# Patient Record
Sex: MALE | Race: WHITE | NOT HISPANIC OR LATINO | Employment: OTHER | URBAN - METROPOLITAN AREA
[De-identification: names, ages, dates, MRNs, and addresses within clinical notes are randomized per-mention and may not be internally consistent; named-entity substitution may affect disease eponyms.]

---

## 2022-06-17 ENCOUNTER — APPOINTMENT (OUTPATIENT)
Dept: CT IMAGING | Facility: CLINIC | Age: 67
End: 2022-06-17
Attending: EMERGENCY MEDICINE
Payer: COMMERCIAL

## 2022-06-17 ENCOUNTER — HOSPITAL ENCOUNTER (EMERGENCY)
Facility: CLINIC | Age: 67
Discharge: HOME | End: 2022-06-17
Attending: EMERGENCY MEDICINE | Admitting: EMERGENCY MEDICINE
Payer: COMMERCIAL

## 2022-06-17 VITALS
TEMPERATURE: 97.1 F | SYSTOLIC BLOOD PRESSURE: 129 MMHG | RESPIRATION RATE: 18 BRPM | OXYGEN SATURATION: 97 % | DIASTOLIC BLOOD PRESSURE: 82 MMHG | HEART RATE: 61 BPM

## 2022-06-17 DIAGNOSIS — S09.90XA CLOSED HEAD INJURY, INITIAL ENCOUNTER: ICD-10-CM

## 2022-06-17 DIAGNOSIS — S00.83XA CONTUSION OF FACE, INITIAL ENCOUNTER: ICD-10-CM

## 2022-06-17 DIAGNOSIS — S00.432A HEMATOMA OF LEFT AURICULAR REGION: ICD-10-CM

## 2022-06-17 DIAGNOSIS — F10.920 ACUTE ALCOHOLIC INTOXICATION WITHOUT COMPLICATION (H): ICD-10-CM

## 2022-06-17 LAB
ALBUMIN SERPL-MCNC: 3.3 G/DL (ref 3.4–5)
ALP SERPL-CCNC: 85 U/L (ref 40–150)
ALT SERPL W P-5'-P-CCNC: 16 U/L (ref 0–70)
ANION GAP SERPL CALCULATED.3IONS-SCNC: 8 MMOL/L (ref 3–14)
AST SERPL W P-5'-P-CCNC: 20 U/L (ref 0–45)
BASOPHILS # BLD AUTO: 0.1 10E3/UL (ref 0–0.2)
BASOPHILS NFR BLD AUTO: 1 %
BILIRUB SERPL-MCNC: 0.3 MG/DL (ref 0.2–1.3)
BUN SERPL-MCNC: 13 MG/DL (ref 7–30)
CALCIUM SERPL-MCNC: 8.2 MG/DL (ref 8.5–10.1)
CHLORIDE BLD-SCNC: 103 MMOL/L (ref 94–109)
CO2 SERPL-SCNC: 27 MMOL/L (ref 20–32)
CREAT SERPL-MCNC: 0.8 MG/DL (ref 0.66–1.25)
EOSINOPHIL # BLD AUTO: 0.1 10E3/UL (ref 0–0.7)
EOSINOPHIL NFR BLD AUTO: 2 %
ERYTHROCYTE [DISTWIDTH] IN BLOOD BY AUTOMATED COUNT: 13.7 % (ref 10–15)
ETHANOL SERPL-MCNC: 0.21 G/DL
GFR SERPL CREATININE-BSD FRML MDRD: >90 ML/MIN/1.73M2
GLUCOSE BLD-MCNC: 91 MG/DL (ref 70–99)
HCT VFR BLD AUTO: 43 % (ref 40–53)
HGB BLD-MCNC: 14.1 G/DL (ref 13.3–17.7)
IMM GRANULOCYTES # BLD: 0 10E3/UL
IMM GRANULOCYTES NFR BLD: 0 %
LYMPHOCYTES # BLD AUTO: 3 10E3/UL (ref 0.8–5.3)
LYMPHOCYTES NFR BLD AUTO: 41 %
MAGNESIUM SERPL-MCNC: 2.2 MG/DL (ref 1.6–2.3)
MCH RBC QN AUTO: 33 PG (ref 26.5–33)
MCHC RBC AUTO-ENTMCNC: 32.8 G/DL (ref 31.5–36.5)
MCV RBC AUTO: 101 FL (ref 78–100)
MONOCYTES # BLD AUTO: 0.8 10E3/UL (ref 0–1.3)
MONOCYTES NFR BLD AUTO: 10 %
NEUTROPHILS # BLD AUTO: 3.4 10E3/UL (ref 1.6–8.3)
NEUTROPHILS NFR BLD AUTO: 46 %
NRBC # BLD AUTO: 0 10E3/UL
NRBC BLD AUTO-RTO: 0 /100
PLATELET # BLD AUTO: 220 10E3/UL (ref 150–450)
POTASSIUM BLD-SCNC: 4.2 MMOL/L (ref 3.4–5.3)
PROT SERPL-MCNC: 6.6 G/DL (ref 6.8–8.8)
RBC # BLD AUTO: 4.27 10E6/UL (ref 4.4–5.9)
SODIUM SERPL-SCNC: 138 MMOL/L (ref 133–144)
VALPROATE SERPL-MCNC: 92 MG/L
WBC # BLD AUTO: 7.4 10E3/UL (ref 4–11)

## 2022-06-17 PROCEDURE — 70450 CT HEAD/BRAIN W/O DYE: CPT

## 2022-06-17 PROCEDURE — 82077 ASSAY SPEC XCP UR&BREATH IA: CPT | Performed by: EMERGENCY MEDICINE

## 2022-06-17 PROCEDURE — 83735 ASSAY OF MAGNESIUM: CPT | Performed by: EMERGENCY MEDICINE

## 2022-06-17 PROCEDURE — 36415 COLL VENOUS BLD VENIPUNCTURE: CPT | Performed by: EMERGENCY MEDICINE

## 2022-06-17 PROCEDURE — 72125 CT NECK SPINE W/O DYE: CPT

## 2022-06-17 PROCEDURE — 69000 DRG XTRNL EAR ABSC/HEM SMPL: CPT | Mod: LT

## 2022-06-17 PROCEDURE — 80053 COMPREHEN METABOLIC PANEL: CPT | Performed by: EMERGENCY MEDICINE

## 2022-06-17 PROCEDURE — 80164 ASSAY DIPROPYLACETIC ACD TOT: CPT | Performed by: EMERGENCY MEDICINE

## 2022-06-17 PROCEDURE — 93005 ELECTROCARDIOGRAM TRACING: CPT | Mod: 59

## 2022-06-17 PROCEDURE — 250N000013 HC RX MED GY IP 250 OP 250 PS 637: Performed by: EMERGENCY MEDICINE

## 2022-06-17 PROCEDURE — 96360 HYDRATION IV INFUSION INIT: CPT | Mod: 59

## 2022-06-17 PROCEDURE — 85025 COMPLETE CBC W/AUTO DIFF WBC: CPT | Performed by: EMERGENCY MEDICINE

## 2022-06-17 PROCEDURE — 258N000003 HC RX IP 258 OP 636: Performed by: EMERGENCY MEDICINE

## 2022-06-17 PROCEDURE — 99285 EMERGENCY DEPT VISIT HI MDM: CPT | Mod: 25

## 2022-06-17 RX ORDER — LEVOFLOXACIN 750 MG/1
750 TABLET, FILM COATED ORAL DAILY
Qty: 7 TABLET | Refills: 0 | Status: SHIPPED | OUTPATIENT
Start: 2022-06-17 | End: 2022-06-24

## 2022-06-17 RX ORDER — ONDANSETRON 2 MG/ML
4 INJECTION INTRAMUSCULAR; INTRAVENOUS ONCE
Status: DISCONTINUED | OUTPATIENT
Start: 2022-06-17 | End: 2022-06-18 | Stop reason: HOSPADM

## 2022-06-17 RX ORDER — OXYCODONE HYDROCHLORIDE 5 MG/1
5-10 TABLET ORAL EVERY 6 HOURS PRN
Qty: 10 TABLET | Refills: 0 | Status: SHIPPED | OUTPATIENT
Start: 2022-06-17 | End: 2022-06-20

## 2022-06-17 RX ORDER — OXYCODONE HYDROCHLORIDE 5 MG/1
5 TABLET ORAL ONCE
Status: COMPLETED | OUTPATIENT
Start: 2022-06-17 | End: 2022-06-17

## 2022-06-17 RX ORDER — SODIUM CHLORIDE 9 MG/ML
INJECTION, SOLUTION INTRAVENOUS CONTINUOUS
Status: DISCONTINUED | OUTPATIENT
Start: 2022-06-17 | End: 2022-06-18 | Stop reason: HOSPADM

## 2022-06-17 RX ADMIN — SODIUM CHLORIDE 1000 ML: 9 INJECTION, SOLUTION INTRAVENOUS at 19:55

## 2022-06-17 RX ADMIN — OXYCODONE HYDROCHLORIDE 5 MG: 5 TABLET ORAL at 22:03

## 2022-06-17 ASSESSMENT — ENCOUNTER SYMPTOMS
CONSTITUTIONAL NEGATIVE: 1
HEADACHES: 0
MUSCULOSKELETAL NEGATIVE: 1
DIZZINESS: 0
EYES NEGATIVE: 1

## 2022-06-18 LAB
ATRIAL RATE - MUSE: 60 BPM
DIASTOLIC BLOOD PRESSURE - MUSE: NORMAL MMHG
INTERPRETATION ECG - MUSE: NORMAL
P AXIS - MUSE: 80 DEGREES
PR INTERVAL - MUSE: 186 MS
QRS DURATION - MUSE: 112 MS
QT - MUSE: 412 MS
QTC - MUSE: 412 MS
R AXIS - MUSE: 56 DEGREES
SYSTOLIC BLOOD PRESSURE - MUSE: NORMAL MMHG
T AXIS - MUSE: 69 DEGREES
VENTRICULAR RATE- MUSE: 60 BPM

## 2022-06-18 NOTE — ED TRIAGE NOTES
Fell off barstool at [employer] 1 hr ago.  Pt is intoxicated.  Lt ear swelling and ecchymosis.  Pt denies any pain.

## 2022-06-18 NOTE — DISCHARGE INSTRUCTIONS
Discharge Instructions  Head Injury    You have been seen today for a head injury. Your evaluation included a history and physical examination. You may have had a CT (CAT) scan performed, though most head injuries do not require a scan. Based on this evaluation, your provider today does not feel that your head injury is serious.    Generally, every Emergency Department visit should have a follow-up clinic visit with either a primary or a specialty clinic/provider. Please follow-up as instructed by your emergency provider today.  Return to the Emergency Department if:  You are confused or you are not acting right.  Your headache gets worse or you start to have a really bad headache even with your recommended treatment plan.  You vomit (throw up) more than once.  You have a seizure.  You have trouble walking.  You have weakness or paralysis (cannot move) in an arm or a leg.  You have blood or fluid coming from your ears or nose.  You have new symptoms or anything that worries you.    Sleeping:  It is okay for you to sleep, but someone should wake you up if instructed by your provider, and someone should check on you at your usual time to wake up.     Activity:  Do not drive for at least 24 hours.  Do not drive if you have dizzy spells or trouble concentrating, or remembering things.  Do not return to any contact sports until cleared by your regular provider.     MORE INFORMATION:    Concussion:  A concussion is a minor head injury that may cause temporary problems with the way the brain works. Although concussions are important, they are generally not an emergency or a reason that a person needs to be hospitalized. Some concussion symptoms include confusion, amnesia (forgetful), nausea (sick to your stomach) and vomiting (throwing up), dizziness, fatigue, memory or concentration problems, irritability and sleep problems. For most people, concussions are mild and temporary but some will have more severe and persistent  symptoms that require on-going care and treatment.  CT Scans: Your evaluation today may have included a CT scan (CAT scan) to look for things like bleeding or a skull fracture (broken bone).  CT scans involve radiation and too many CT scans can cause serious health problems like cancer, especially in children.  Because of this, your provider may not have ordered a CT scan today if they think you are at low risk for a serious or life threatening problem.    If you were given a prescription for medicine here today, be sure to read all of the information (including the package insert) that comes with your prescription.  This will include important information about the medicine, its side effects, and any warnings that you need to know about.  The pharmacist who fills the prescription can provide more information and answer questions you may have about the medicine.  If you have questions or concerns that the pharmacist cannot address, please call or return to the Emergency Department.     Remember that you can always come back to the Emergency Department if you are not able to see your regular provider in the amount of time listed above, if you get any new symptoms, or if there is anything that worries you.     You had a hematoma (blood collection) of your left ear which has been drained to help prevent a chronic deformity. Keep the bandage on until follow-up with primary doctor in 3 days (keep it on for up to 7 days if able) or consider ENT (ear nose throat) specialty evaluation with ongoing swelling, redness, increasing pain, fever, or return to the ED.

## 2022-06-18 NOTE — ED NOTES
Pt's son assisted pt up to restroom. RN noticed pt walking in the hallway very swiftly and unsteady. Pt instructed to call for help prior to getting up going forward.

## 2022-06-18 NOTE — ED PROVIDER NOTES
History     Chief Complaint:  Fall     HPI   Elie Low is a 66 year old male with a history of hypertension, high cholesterol and heart disease who presents with concerns for a fall off a stool resulting in head injury.  He notes he was at [employer] after work.  He had 5 beers.  He does not drink daily.  He does not recall falling or passing out but thinks he might of lost consciousness as he remembers being on the floor with someone helping him.  Per his son who was not there, there was no clear witness of the event.  The patient notes he had nausea and vomited multiple times.  He no longer feels nausea.  He denies any pain aside from his left ear.  He denies headache, neck pain, back pain, extremity pain.  He denies any chest pain or shortness of breath.  Denies abdominal pain.  He denies any use of blood thinning medications.    ROS:  Review of Systems   Constitutional: Negative.    HENT:        Positive for left ear pain   Eyes: Negative.    Musculoskeletal: Negative.    Neurological: Negative for dizziness and headaches.   All other systems reviewed and are negative.        Allergies:  Coreg [Carvedilol]     Medications:    aspirin 81 MG tablet  atorvastatin (LIPITOR) 80 MG tablet  divalproex (DEPAKOTE) 500 MG EC tablet  lisinopril (PRINIVIL,ZESTRIL) 5 MG tablet  Multiple Vitamins-Minerals (MULTIVITAL PO)  nicotine (NICODERM CQ) 21 MG/24HR patch 2h hr  Nitroglycerin 400 MCG/SPRAY AERS        Past Medical History:    Localization-related (focal) (partial) epilepsy and epileptic syndromes with complex partial seizures, without mention of intractable epilepsy (HRC)       Hypertension (HRC)       Hyperlipidemia (HRC)       Coronary artery disease (HRC)       Peripheral vascular disease (HRC)       Unspecified personality disorder           Past Surgical History:    PTCA     LW Problem: PTCA S/p LW Modifier: 3.5 mm Cypher Stent in RCA LW Onset: [id number]     HERNIA REPAIR          AORTO-FEMORAL BYPASS  GRAFT             Family History:    Cardiovascular Disease Birth Father        High Blood Pressure Birth Father        High Blood Pressure Birth Mother        Cardiovascular Disease Brother        Diabetes Maternal Grandfather        High Blood Pressure Maternal Grandfather             Social History:  Current Some Day Smoker Cigarettes 0.25   Started: 10/16/1963   Smokeless Tobacco: Former User     Quit: 11/01/2011     Social History  Comments: Approx. 2-3 cigarettes/day     Social History  Alcohol Use Standard Drinks/Week Comments   Yes 0 (1 standard drink = 0.6 oz pure alcohol) 1 drink per month     Is  and lives with his wife.  He is here with his son.      Physical Exam     Patient Vitals for the past 24 hrs:   BP Temp Pulse Resp SpO2   06/17/22 1950 120/68 -- 57 22 97 %   06/17/22 1936 138/75 97.1  F (36.2  C) 55 18 99 %    O2 sat 97% RA    Physical Exam  General: Elderly male sitting upright  Eyes: PERRL, EOMI.  Conjunctive within normal limits.  Right supraorbital ecchymoses without associated orbital ridge tenderness.  HENT: No scalp or facial bone tenderness to palpation.  Left external ear is ecchymotic without palpable full hematoma.  No hemotympanum.  Multiple missing teeth.  Moist mucous membranes, oropharynx clear.   Neck: Maintained in rigid cervical collar with C-spine precautions.  Nontender to palpation.  No palpable step-off or crepitus.  CV: Normal S1S2, no murmur, rub or gallop. Regular rate and rhythm  Resp: Clear to auscultation bilaterally, no wheezes, rales or rhonchi. Normal respiratory effort.  GI: Abdomen is soft, nontender and nondistended. No palpable masses. No rebound or guarding.  MSK: No edema. Nontender. Normal active range of motion.  Skin: Warm and dry.  Superficial abrasion over the left small digit.  Ecchymoses right supraorbital region, left external ear.    Neuro: Alert and oriented. Responds appropriately to all questions and commands. No focal findings appreciated.  Normal muscle tone.  Psych: Normal mood and affect.     Emergency Department Course   ECG:  A twelve-lead ECG was performed at 1951 on 6/17/2022 for possible syncope.  Sinus rhythm with marked sinus arrhythmia.  Nonspecific T wave abnormality.  Abnormal ECG.  Ventricular rate 60 bpm WY interval 186 ms QRS duration 112 ms QT/QTc 412/412 ms PRT axes 80 56 69  Preliminary read per Dr. Scarlett Walker at 1953 on 6/17/2022    Imaging:  Cervical spine CT w/o contrast   Final Result   IMPRESSION:      HEAD CT:   1. No acute intracranial abnormality.      CERVICAL SPINE CT:   1.  No acute traumatic injury of the cervical spine.        CT Head w/o Contrast   Final Result   IMPRESSION:      HEAD CT:   1. No acute intracranial abnormality.      CERVICAL SPINE CT:   1.  No acute traumatic injury of the cervical spine.           Report per radiology    Laboratory:  Labs Ordered and Resulted from Time of ED Arrival to Time of ED Departure   COMPREHENSIVE METABOLIC PANEL - Abnormal       Result Value    Sodium 138      Potassium 4.2      Chloride 103      Carbon Dioxide (CO2) 27      Anion Gap 8      Urea Nitrogen 13      Creatinine 0.80      Calcium 8.2 (*)     Glucose 91      Alkaline Phosphatase 85      AST 20      ALT 16      Protein Total 6.6 (*)     Albumin 3.3 (*)     Bilirubin Total 0.3      GFR Estimate >90     ETHYL ALCOHOL LEVEL - Abnormal    Alcohol ethyl 0.21 (*)    CBC WITH PLATELETS AND DIFFERENTIAL - Abnormal    WBC Count 7.4      RBC Count 4.27 (*)     Hemoglobin 14.1      Hematocrit 43.0       (*)     MCH 33.0      MCHC 32.8      RDW 13.7      Platelet Count 220      % Neutrophils 46      % Lymphocytes 41      % Monocytes 10      % Eosinophils 2      % Basophils 1      % Immature Granulocytes 0      NRBCs per 100 WBC 0      Absolute Neutrophils 3.4      Absolute Lymphocytes 3.0      Absolute Monocytes 0.8      Absolute Eosinophils 0.1      Absolute Basophils 0.1      Absolute Immature Granulocytes 0.0       Absolute NRBCs 0.0     MAGNESIUM - Normal    Magnesium 2.2     VALPROIC ACID - Normal    Valproic acid 92          Procedures     Incision and Drainage     Procedure: Incision and Drainage     Consent: Verbal    Indication: Hematoma (left external ear)    Location: Left external ear    Size: 2 cm    Preparation: Povidone-iodine     Anesthesia/Sedation: None     Procedure Detail:    Aspiration: No  Incision Type: Single straight  Scalpel: 11  Lesion Management: Irrigated   Wound Management: Left open  with bolsters in place, head wrap in place    Patient Status: The patient tolerated the procedure well: Yes. There were no complications.      Emergency Department Course:    Reviewed:  I reviewed nursing notes, vitals and past medical history    Assessments:   I obtained history and examined the patient as noted above.    I rechecked the patient and explained findings. He has taken his collar off without asking.  He is ranging his neck without pain.  He clinically is appropriate without slurring of speech or obvious acute intoxication.   Procedure as above.  Patient tolerated this well.      Interventions:  Medications   0.9% sodium chloride BOLUS (0 mLs Intravenous Stopped 6/17/22 2118)   oxyCODONE (ROXICODONE) tablet 5 mg (5 mg Oral Given 6/17/22 2203)        Disposition:  The patient was discharged to home.     Impression & Plan        Medical Decision Making:  Elie Low is a 66-year-old male who presents emergency department with concerns for a fall off a barstool after drinking more than he typically would after work.  He has evidence of facial and ear injury.  Given his elevated EtOH, head CT and cervical spine CT were obtained but did not show any worrisome pathology.  He had a developing significant auricular hematoma which was drained here with bolstering and dental pressure head wrap placed afterward.  Recommended keeping this in place for 7 days or until reassessment with ENT and/or primary care  provider.  Prophylaxis with Levaquin.  Pain control with oxycodone as needed, although recommended to avoid if possible.  He continue be neurologically intact throughout his stay and was clinically sobering over his ED stay as well.  Son lives with him and will be taking him home.  This seems reasonable.  The patient is ambulatory with steady gait on discharge.  As needed return to the emergency department should mental status change occur or should he develop signs of infection such as fever, spreading redness, worsening pain.    Diagnosis:    ICD-10-CM    1. Closed head injury, initial encounter  S09.90XA    2. Contusion of face, initial encounter  S00.83XA    3. Hematoma of left auricular region  S00.432A    4. Acute alcoholic intoxication without complication (H)  F10.920         Discharge Medications:  Discharge Medication List as of 6/17/2022  9:56 PM      START taking these medications    Details   levofloxacin (LEVAQUIN) 750 MG tablet Take 1 tablet (750 mg) by mouth daily for 7 days, Disp-7 tablet, R-0, E-Prescribe      oxyCODONE (ROXICODONE) 5 MG tablet Take 1-2 tablets (5-10 mg) by mouth every 6 hours as needed for pain, Disp-10 tablet, R-0, E-Prescribe              6/17/2022   Scarlett Walker MD Jonkman, Tracy Dianne, MD  06/18/22 [id number]